# Patient Record
Sex: MALE | Race: OTHER | Employment: FULL TIME | URBAN - METROPOLITAN AREA
[De-identification: names, ages, dates, MRNs, and addresses within clinical notes are randomized per-mention and may not be internally consistent; named-entity substitution may affect disease eponyms.]

---

## 2023-01-24 SDOH — HEALTH STABILITY: PHYSICAL HEALTH: ON AVERAGE, HOW MANY MINUTES DO YOU ENGAGE IN EXERCISE AT THIS LEVEL?: 40 MIN

## 2023-01-24 SDOH — HEALTH STABILITY: PHYSICAL HEALTH: ON AVERAGE, HOW MANY DAYS PER WEEK DO YOU ENGAGE IN MODERATE TO STRENUOUS EXERCISE (LIKE A BRISK WALK)?: 2 DAYS

## 2023-01-24 ASSESSMENT — SOCIAL DETERMINANTS OF HEALTH (SDOH)
WITHIN THE LAST YEAR, HAVE YOU BEEN HUMILIATED OR EMOTIONALLY ABUSED IN OTHER WAYS BY YOUR PARTNER OR EX-PARTNER?: NO
WITHIN THE LAST YEAR, HAVE YOU BEEN KICKED, HIT, SLAPPED, OR OTHERWISE PHYSICALLY HURT BY YOUR PARTNER OR EX-PARTNER?: NO
WITHIN THE LAST YEAR, HAVE YOU BEEN AFRAID OF YOUR PARTNER OR EX-PARTNER?: NO
WITHIN THE LAST YEAR, HAVE TO BEEN RAPED OR FORCED TO HAVE ANY KIND OF SEXUAL ACTIVITY BY YOUR PARTNER OR EX-PARTNER?: NO

## 2023-01-27 ENCOUNTER — OFFICE VISIT (OUTPATIENT)
Dept: FAMILY MEDICINE CLINIC | Age: 38
End: 2023-01-27
Payer: COMMERCIAL

## 2023-01-27 ENCOUNTER — HOSPITAL ENCOUNTER (OUTPATIENT)
Dept: GENERAL RADIOLOGY | Age: 38
Discharge: HOME | End: 2023-01-27
Payer: COMMERCIAL

## 2023-01-27 ENCOUNTER — HOSPITAL ENCOUNTER (OUTPATIENT)
Age: 38
Discharge: HOME | End: 2023-01-27
Payer: COMMERCIAL

## 2023-01-27 VITALS
TEMPERATURE: 97.5 F | BODY MASS INDEX: 25.97 KG/M2 | OXYGEN SATURATION: 99 % | SYSTOLIC BLOOD PRESSURE: 90 MMHG | DIASTOLIC BLOOD PRESSURE: 78 MMHG | HEART RATE: 83 BPM | HEIGHT: 70 IN | WEIGHT: 181.4 LBS

## 2023-01-27 DIAGNOSIS — G89.29 CHRONIC MIDLINE LOW BACK PAIN WITHOUT SCIATICA: ICD-10-CM

## 2023-01-27 DIAGNOSIS — M54.50 CHRONIC MIDLINE LOW BACK PAIN WITHOUT SCIATICA: ICD-10-CM

## 2023-01-27 DIAGNOSIS — E78.2 MIXED HYPERLIPIDEMIA: ICD-10-CM

## 2023-01-27 DIAGNOSIS — Z00.00 ANNUAL PHYSICAL EXAM: Primary | ICD-10-CM

## 2023-01-27 PROCEDURE — 72100 X-RAY EXAM L-S SPINE 2/3 VWS: CPT

## 2023-01-27 PROCEDURE — 99385 PREV VISIT NEW AGE 18-39: CPT | Performed by: FAMILY MEDICINE

## 2023-01-27 SDOH — ECONOMIC STABILITY: FOOD INSECURITY: WITHIN THE PAST 12 MONTHS, YOU WORRIED THAT YOUR FOOD WOULD RUN OUT BEFORE YOU GOT MONEY TO BUY MORE.: NEVER TRUE

## 2023-01-27 SDOH — ECONOMIC STABILITY: FOOD INSECURITY: WITHIN THE PAST 12 MONTHS, THE FOOD YOU BOUGHT JUST DIDN'T LAST AND YOU DIDN'T HAVE MONEY TO GET MORE.: NEVER TRUE

## 2023-01-27 ASSESSMENT — LIFESTYLE VARIABLES
HOW OFTEN DO YOU HAVE A DRINK CONTAINING ALCOHOL: NEVER
HOW MANY STANDARD DRINKS CONTAINING ALCOHOL DO YOU HAVE ON A TYPICAL DAY: PATIENT DOES NOT DRINK

## 2023-01-27 ASSESSMENT — PATIENT HEALTH QUESTIONNAIRE - PHQ9
SUM OF ALL RESPONSES TO PHQ QUESTIONS 1-9: 0
SUM OF ALL RESPONSES TO PHQ9 QUESTIONS 1 & 2: 0
2. FEELING DOWN, DEPRESSED OR HOPELESS: 0
SUM OF ALL RESPONSES TO PHQ QUESTIONS 1-9: 0
1. LITTLE INTEREST OR PLEASURE IN DOING THINGS: 0
SUM OF ALL RESPONSES TO PHQ QUESTIONS 1-9: 0
SUM OF ALL RESPONSES TO PHQ QUESTIONS 1-9: 0

## 2023-01-27 ASSESSMENT — SOCIAL DETERMINANTS OF HEALTH (SDOH): HOW HARD IS IT FOR YOU TO PAY FOR THE VERY BASICS LIKE FOOD, HOUSING, MEDICAL CARE, AND HEATING?: NOT HARD AT ALL

## 2023-01-27 NOTE — PATIENT INSTRUCTIONS
Patient Education        Low Back Pain: Exercises  Introduction  Here are some examples of exercises for you to try. The exercises may be suggested for a condition or for rehabilitation. Start each exercise slowly. Ease off the exercises if you start to have pain. You will be told when to start these exercises and which ones will work best for you. How to do the exercises  Press-up  Lie on your stomach, supporting your body with your forearms. Press your elbows down into the floor to raise your upper back. As you do this, relax your stomach muscles and allow your back to arch without using your back muscles. As your press up, do not let your hips or pelvis come off the floor. Hold for 15 to 30 seconds, then relax. Repeat 2 to 4 times. Alternate arm and leg (bird dog) exercise  Do this exercise slowly. Try to keep your body straight at all times, and do not let one hip drop lower than the other. Start on the floor, on your hands and knees. Tighten your belly muscles. Raise one leg off the floor, and hold it straight out behind you. Be careful not to let your hip drop down, because that will twist your trunk. Hold for about 6 seconds, then lower your leg and switch to the other leg. Repeat 8 to 12 times on each leg. Over time, work up to holding for 10 to 30 seconds each time. If you feel stable and secure with your leg raised, try raising the opposite arm straight out in front of you at the same time. Knee-to-chest exercise  Lie on your back with your knees bent and your feet flat on the floor. Bring one knee to your chest, keeping the other foot flat on the floor (or keeping the other leg straight, whichever feels better on your lower back). Keep your lower back pressed to the floor. Hold for at least 15 to 30 seconds. Relax, and lower the knee to the starting position. Repeat with the other leg. Repeat 2 to 4 times with each leg.   To get more stretch, put your other leg flat on the floor while pulling your knee to your chest.  Curl-ups  Lie on the floor on your back with your knees bent at a 90-degree angle. Your feet should be flat on the floor, about 12 inches from your buttocks. Cross your arms over your chest. If this bothers your neck, try putting your hands behind your neck (not your head), with your elbows spread apart. Slowly tighten your belly muscles and raise your shoulder blades off the floor. Keep your head in line with your body, and do not press your chin to your chest.  Hold this position for 1 or 2 seconds, then slowly lower yourself back down to the floor. Repeat 8 to 12 times. Pelvic tilt exercise  Lie on your back with your knees bent. \"Brace\" your stomach. This means to tighten your muscles by pulling in and imagining your belly button moving toward your spine. You should feel like your back is pressing to the floor and your hips and pelvis are rocking back. Hold for about 6 seconds while you breathe smoothly. Repeat 8 to 12 times. Heel dig bridging  Lie on your back with both knees bent and your ankles bent so that only your heels are digging into the floor. Your knees should be bent about 90 degrees. Then push your heels into the floor, squeeze your buttocks, and lift your hips off the floor until your shoulders, hips, and knees are all in a straight line. Hold for about 6 seconds as you continue to breathe normally, and then slowly lower your hips back down to the floor and rest for up to 10 seconds. Do 8 to 12 repetitions. Hamstring stretch in doorway  Lie on your back in a doorway, with one leg through the open door. Slide your leg up the wall to straighten your knee. You should feel a gentle stretch down the back of your leg. Hold the stretch for at least 15 to 30 seconds. Do not arch your back, point your toes, or bend either knee. Keep one heel touching the floor and the other heel touching the wall. Repeat with your other leg.   Do 2 to 4 times for each leg.  Hip flexor stretch  Kneel on the floor with one knee bent and one leg behind you. Place your forward knee over your foot. Keep your other knee touching the floor. Slowly push your hips forward until you feel a stretch in the upper thigh of your rear leg. Hold the stretch for at least 15 to 30 seconds. Repeat with your other leg. Do 2 to 4 times on each side. Wall sit  Stand with your back 10 to 12 inches away from a wall. Lean into the wall until your back is flat against it. Slowly slide down until your knees are slightly bent, pressing your lower back into the wall. Hold for about 6 seconds, then slide back up the wall. Repeat 8 to 12 times. Follow-up care is a key part of your treatment and safety. Be sure to make and go to all appointments, and call your doctor if you are having problems. It's also a good idea to know your test results and keep a list of the medicines you take. Current as of: March 9, 2022               Content Version: 13.5  © 2006-2022 [employer]. Care instructions adapted under license by [facility] ([facility]). If you have questions about a medical condition or this instruction, always ask your healthcare professional. [place] [address] any warranty or liability for your use of this information.

## 2023-01-27 NOTE — PROGRESS NOTES
Zara Ernst is a 42 y.o. Male who came into the clinic today to establish care with me and for   appropriate management. Since I am seeing the patient for the first time today I did review in detail his medical   history. The patient informs me that he is not on any medication for any chronic medical problems. The patient   had some blood work done in January 2022 when his lipids were found to be elevated. The patient is trying to   manage it with diet and exercise. The patient would like to have his blood work rechecked at this time since it   has been a while it has been checked. The patient informs me that he has been noticing on and off lower back pain. As per the patient usually it happens   after he does some physical activity and he takes over-the-counter NSAIDs for the same with relief. The patient   denies any radiation of the pain. The patient denies any history of any trauma. The patient at this time would like   it to be further investigated. The patient also complains of noticing occasional headaches and cough cramps. I   recommended the patient to keep himself well-hydrated and to take over-the-counter NSAIDs for the same. The   patient would also like to have his vitamin D level checked at this time as well. Otherwise today he did not have   any other questions or concerns and all the question and concerns were appropriately answered. History reviewed. No pertinent past medical history. Patient Active Problem List   Diagnosis    Chronic midline low back pain without sciatica    Mixed hyperlipidemia       History reviewed. No pertinent surgical history.     Family History   Problem Relation Age of Onset    High Blood Pressure Mother     High Blood Pressure Father        Social History     Tobacco Use    Smoking status: Never    Smokeless tobacco: Never   Substance Use Topics    Alcohol use: Yes     Comment: rarely       No current outpatient medications on file prior to visit. No current facility-administered medications on file prior to visit. No Known Allergies    REVIEW OF SYSTEMS:   CONSTITUTIONAL: No weight loss, fever, chills, weakness or fatigue. HEENT: Eyes: No visual loss, blurred vision, double vision or yellow sclerae. Ears, Nose, Throat: No hearing loss, sneezing, congestion, runny nose or sore throat. SKIN: No rash or itching. CARDIOVASCULAR: No chest pain, chest pressure or chest discomfort. No palpitations or edema. RESPIRATORY: No shortness of breath, cough or sputum. GASTROINTESTINAL: No anorexia, nausea, vomiting, diarrhea or constipation. No abdominal pain, hematochezia or melena. GENITOURINARY:No dysuria, urgency, frequency, hematuria. NEUROLOGICAL: No headache, dizziness, syncope, paralysis, ataxia,   numbness or tingling in the extremities. No change in bowel or bladder control. MUSCULOSKELETAL: Complains of muscle pain, back pain, joint pain or stiffness. PSYCHIATRIC: No history of depression or anxiety. ENDOCRINOLOGIC: No reports of sweating, cold or heat intolerance. No polyuria or polydipsia. Objective     . BP 90/78   Pulse 83   Temp 97.5 °F (36.4 °C)   Ht 5' 10\" (1.778 m)   Wt 181 lb 6.4 oz (82.3 kg)   SpO2 99%   BMI 26.03 kg/m²     GENERAL:  Sydnie Hernandez is a 42 y.o.  male who is not in any acute distress. He was well attired and well groomed and was speaking in full sentences. HEENT:  Head:  Atraumatic, normocephalic. Eyes: Both the pupils are round,   equal and reactive to light. No squint noted. Normal red reflex is seen. Ears: Both external auditory canals are clear. No discharge noted. Tympanic membranes healthy. Normal light reflex is seen. Nose: Both the   nares are clear. No discharge noted. No epistaxis. Throat:  No posterior   pharyngeal wall erythema. Oral mucosa moist. No enlarged tonsils. NECK:  Supple. No cervical lymphadenopathy. No thyromegaly.     LUNGS: Normal ventilatory breath sounds are heard bilaterally. No crackles   or wheezes heard. CARDIOVASCULAR:  Normal S1, S2 heard. No murmurs heard. No JVD. GASTROINTESTINAL:  Abdomen soft, nontender. No guarding or rigidity noted. No organomegaly noted. Normal bowel sounds were heard. EXTREMITIES:  All 4 extremities were moving fine. Full range of motion is   present. No deformity noted. Peripheral pulses were felt. No lower   extremity edema. Neurovascular integrity maintained. NEUROLOGICAL:  The patient was alert, conscious, and cooperative. Oriented   to time, place, and person. Muscle power in all 4 limbs is 5/5. Cranial   nerves II thru XII are grossly intact. No sensory or motor loss. PSYCHIATRIC:  Appropriate mood and affect. No flights of ideas or thoughts. Intact recent and past memory. No confusion or delirium. No suicidal or homicidal ideations. SKIN:  Normal healthy. No rashes, marks, moles, or tattoos visible anywhere   on the body. BACK:  Normal alignment of the spine. Full range of motion is present. No   stepoff. No deformity. No spinal or paraspinal tenderness noted. Assessment/Plan     Diagnoses and all orders for this visit:    Annual physical exam  -     CBC with Auto Differential; Future  -     Comprehensive Metabolic Panel; Future  -     LIPID PANEL; Future  -     Vitamin D 25 Hydroxy; Future    Mixed hyperlipidemia  -     LIPID PANEL; Future    Chronic midline low back pain without sciatica  -     Vitamin D 25 Hydroxy; Future  -     XR LUMBAR SPINE (2-3 VIEWS); Future      Advise given:  - Get appropriate investigations done. Will call back with the results and will manage accordingly. - Take OTC NSAIDs as needed for pain. - Drink plenty of fluids.   - Eat five servings of fruits and vegetables everyday. - Discussed importance of regular exercise and recommended starting or continuing a regular exercise program for good health.    - The importance of annual eye exams was reviewed. - The importance of proper foot care and regularly checking feet to prevent sores and possibly loss of limbs was reviewed. next 6 months. - Appropriate handout were given to the patient. -  All the questions and concerns were appropriately answered. - Patient / family member / caregiver verbalized understanding of patient instructions from today's visit. - The patient was advised to follow up with me in 6 months for recheck or can call me before if has any other questions or concerns.

## 2023-01-28 DIAGNOSIS — G89.29 CHRONIC MIDLINE LOW BACK PAIN WITHOUT SCIATICA: ICD-10-CM

## 2023-01-28 DIAGNOSIS — Z00.00 ANNUAL PHYSICAL EXAM: ICD-10-CM

## 2023-01-28 DIAGNOSIS — E78.2 MIXED HYPERLIPIDEMIA: ICD-10-CM

## 2023-01-28 DIAGNOSIS — M54.50 CHRONIC MIDLINE LOW BACK PAIN WITHOUT SCIATICA: ICD-10-CM

## 2023-01-28 LAB
A/G RATIO: 2 (ref 1.1–2.2)
ALBUMIN SERPL-MCNC: 4.5 G/DL (ref 3.4–5)
ALP BLD-CCNC: 67 U/L (ref 40–129)
ALT SERPL-CCNC: 33 U/L (ref 10–40)
ANION GAP SERPL CALCULATED.3IONS-SCNC: 10 MMOL/L (ref 3–16)
AST SERPL-CCNC: 26 U/L (ref 15–37)
BASOPHILS ABSOLUTE: 0 K/UL (ref 0–0.2)
BASOPHILS RELATIVE PERCENT: 0.4 %
BILIRUB SERPL-MCNC: 0.7 MG/DL (ref 0–1)
BUN BLDV-MCNC: 10 MG/DL (ref 7–20)
CALCIUM SERPL-MCNC: 9.4 MG/DL (ref 8.3–10.6)
CHLORIDE BLD-SCNC: 104 MMOL/L (ref 99–110)
CHOLESTEROL, TOTAL: 206 MG/DL (ref 0–199)
CO2: 28 MMOL/L (ref 21–32)
CREAT SERPL-MCNC: 0.9 MG/DL (ref 0.9–1.3)
EOSINOPHILS ABSOLUTE: 0 K/UL (ref 0–0.6)
EOSINOPHILS RELATIVE PERCENT: 1.1 %
GFR SERPL CREATININE-BSD FRML MDRD: >60 ML/MIN/{1.73_M2}
GLUCOSE BLD-MCNC: 99 MG/DL (ref 70–99)
HCT VFR BLD CALC: 45.7 % (ref 40.5–52.5)
HDLC SERPL-MCNC: 39 MG/DL (ref 40–60)
HEMOGLOBIN: 15.3 G/DL (ref 13.5–17.5)
LDL CHOLESTEROL CALCULATED: 138 MG/DL
LYMPHOCYTES ABSOLUTE: 1.4 K/UL (ref 1–5.1)
LYMPHOCYTES RELATIVE PERCENT: 42.8 %
MCH RBC QN AUTO: 29.8 PG (ref 26–34)
MCHC RBC AUTO-ENTMCNC: 33.4 G/DL (ref 31–36)
MCV RBC AUTO: 89.3 FL (ref 80–100)
MONOCYTES ABSOLUTE: 0.2 K/UL (ref 0–1.3)
MONOCYTES RELATIVE PERCENT: 5.2 %
NEUTROPHILS ABSOLUTE: 1.7 K/UL (ref 1.7–7.7)
NEUTROPHILS RELATIVE PERCENT: 50.5 %
PDW BLD-RTO: 12.9 % (ref 12.4–15.4)
PLATELET # BLD: 208 K/UL (ref 135–450)
PMV BLD AUTO: 7.9 FL (ref 5–10.5)
POTASSIUM SERPL-SCNC: 4.2 MMOL/L (ref 3.5–5.1)
RBC # BLD: 5.12 M/UL (ref 4.2–5.9)
SODIUM BLD-SCNC: 142 MMOL/L (ref 136–145)
TOTAL PROTEIN: 6.8 G/DL (ref 6.4–8.2)
TRIGL SERPL-MCNC: 145 MG/DL (ref 0–150)
VITAMIN D 25-HYDROXY: 17.8 NG/ML
VLDLC SERPL CALC-MCNC: 29 MG/DL
WBC # BLD: 3.3 K/UL (ref 4–11)

## 2023-02-14 ENCOUNTER — TELEPHONE (OUTPATIENT)
Dept: FAMILY MEDICINE CLINIC | Age: 38
End: 2023-02-14

## 2023-02-14 SDOH — ECONOMIC STABILITY: INCOME INSECURITY: HOW HARD IS IT FOR YOU TO PAY FOR THE VERY BASICS LIKE FOOD, HOUSING, MEDICAL CARE, AND HEATING?: NOT VERY HARD

## 2023-02-14 SDOH — ECONOMIC STABILITY: FOOD INSECURITY: WITHIN THE PAST 12 MONTHS, YOU WORRIED THAT YOUR FOOD WOULD RUN OUT BEFORE YOU GOT MONEY TO BUY MORE.: NEVER TRUE

## 2023-02-14 SDOH — ECONOMIC STABILITY: FOOD INSECURITY: WITHIN THE PAST 12 MONTHS, THE FOOD YOU BOUGHT JUST DIDN'T LAST AND YOU DIDN'T HAVE MONEY TO GET MORE.: NEVER TRUE

## 2023-02-14 SDOH — ECONOMIC STABILITY: HOUSING INSECURITY
IN THE LAST 12 MONTHS, WAS THERE A TIME WHEN YOU DID NOT HAVE A STEADY PLACE TO SLEEP OR SLEPT IN A SHELTER (INCLUDING NOW)?: NO

## 2023-02-14 SDOH — ECONOMIC STABILITY: TRANSPORTATION INSECURITY
IN THE PAST 12 MONTHS, HAS LACK OF TRANSPORTATION KEPT YOU FROM MEETINGS, WORK, OR FROM GETTING THINGS NEEDED FOR DAILY LIVING?: NO

## 2023-02-15 ENCOUNTER — OFFICE VISIT (OUTPATIENT)
Dept: FAMILY MEDICINE CLINIC | Age: 38
End: 2023-02-15
Payer: COMMERCIAL

## 2023-02-15 VITALS
DIASTOLIC BLOOD PRESSURE: 80 MMHG | WEIGHT: 170.4 LBS | TEMPERATURE: 97.1 F | BODY MASS INDEX: 25.82 KG/M2 | HEIGHT: 68 IN | HEART RATE: 79 BPM | OXYGEN SATURATION: 95 % | SYSTOLIC BLOOD PRESSURE: 108 MMHG

## 2023-02-15 DIAGNOSIS — K64.5 EXTERNAL HEMORRHOID, THROMBOSED: Primary | ICD-10-CM

## 2023-02-15 PROCEDURE — 99213 OFFICE O/P EST LOW 20 MIN: CPT | Performed by: FAMILY MEDICINE

## 2023-02-15 RX ORDER — HYDROCORTISONE ACETATE 25 MG/1
25 SUPPOSITORY RECTAL EVERY 12 HOURS
Qty: 12 SUPPOSITORY | Refills: 0 | Status: SHIPPED | OUTPATIENT
Start: 2023-02-15

## 2023-02-15 RX ORDER — LIDOCAINE HCL AND HYDROCORTISONE ACETATE 28; 5.5 MG/G; MG/G
1 GEL RECTAL 2 TIMES DAILY
Qty: 1 EACH | Refills: 0 | Status: SHIPPED | OUTPATIENT
Start: 2023-02-15

## 2023-02-15 NOTE — PROGRESS NOTES
Zara Lomeli is a 42 y.o. Male who came into the clinic today with concerns of tender mass felt at   the anal opening since past 2 days. The patient informs me that since past 2 days he has been noticing a very   tender mass at his anal opening. The patient denies any bleeding or discharge in the area. The patient denies   any history of hemorrhoids in the past.  The patient denies any personal or family history of any colon cancer or   anal cancer. I advised the patient that at seems consistent with thrombosed hemorrhoids and he is agreeable to   visit specialist office for further management. No other questions or concerns today and all the question concerns   were appropriately answered. History reviewed. No pertinent past medical history. Patient Active Problem List   Diagnosis    Chronic midline low back pain without sciatica    Mixed hyperlipidemia       History reviewed. No pertinent surgical history. Family History   Problem Relation Age of Onset    High Blood Pressure Mother     High Blood Pressure Father        Social History     Tobacco Use    Smoking status: Never    Smokeless tobacco: Never   Substance Use Topics    Alcohol use: Yes     Comment: rarely       No current outpatient medications on file prior to visit. No current facility-administered medications on file prior to visit. No Known Allergies    REVIEW OF SYSTEMS:   CONSTITUTIONAL: No weight loss, fever, chills, weakness or fatigue. HEENT: Eyes: No visual loss, blurred vision, double vision or yellow sclerae. Ears, Nose, Throat: No hearing loss, sneezing, congestion, runny nose or sore throat. SKIN: No rash or itching. CARDIOVASCULAR: No chest pain, chest pressure or chest discomfort. No palpitations or edema. RESPIRATORY: No shortness of breath, cough or sputum. GASTROINTESTINAL: No anorexia, nausea, vomiting, diarrhea or constipation. No abdominal pain, hematochezia or melena. GENITOURINARY:No dysuria, urgency, frequency, hematuria. External hemorrhoids  NEUROLOGICAL: No headache, dizziness, syncope, paralysis, ataxia,   numbness or tingling in the extremities. No change in bowel or bladder control. MUSCULOSKELETAL: No muscle pain, back pain, joint pain or stiffness. PSYCHIATRIC: No history of depression or anxiety. ENDOCRINOLOGIC: No reports of sweating, cold or heat intolerance. No polyuria or polydipsia. Objective     . /80   Pulse 79   Temp 97.1 °F (36.2 °C)   Ht 5' 8\" (1.727 m)   Wt 170 lb 6.4 oz (77.3 kg)   SpO2 95%   BMI 25.91 kg/m²     GENERAL:  Aicha Rao is a 42 y.o.  male who is not in any acute distress. He was well attired and well groomed and was speaking in full sentences. LUNGS:  Normal ventilatory breath sounds are heard bilaterally. No crackles   or wheezes heard. CARDIOVASCULAR:  Normal S1, S2 heard. No murmurs heard. No JVD. RECTAL: Round, very tender, bluish mass seen and felt at the anal opening consistent   with external hemorrhoids. Further exam could not be done due to pain. Assessment/Plan     Diagnoses and all orders for this visit:    External hemorrhoid, thrombosed  -     hydrocortisone (ANUSOL-HC) 25 MG suppository; Place 1 suppository rectally in the morning and 1 suppository in the evening.  -     Lidocaine-Hydrocortisone Ace 2.8-0.55 % GEL; Place 1 Applicatorful rectally in the morning and at bedtime  -     Jaison Mosley MD, Colorectal Surgery, [facility]      Advise given:  - Take all prescription medication as prescribed. - Drink plenty of fluids.   - Eat five servings of fruits and vegetables everyday. - Appropriate handout were given to the patient. -  All the questions and concerns were appropriately answered. - Patient / family member / caregiver verbalized understanding of patient instructions from today's visit.    - The patient was advised to follow up with me in 3 months for recheck or can call me before if has any other questions or concerns.

## 2023-02-16 ENCOUNTER — OFFICE VISIT (OUTPATIENT)
Dept: SURGERY | Age: 38
End: 2023-02-16

## 2023-02-16 ENCOUNTER — TELEPHONE (OUTPATIENT)
Dept: SURGERY | Age: 38
End: 2023-02-16

## 2023-02-16 VITALS
BODY MASS INDEX: 26.22 KG/M2 | TEMPERATURE: 98.3 F | HEIGHT: 68 IN | HEART RATE: 79 BPM | OXYGEN SATURATION: 100 % | DIASTOLIC BLOOD PRESSURE: 76 MMHG | SYSTOLIC BLOOD PRESSURE: 130 MMHG | WEIGHT: 173 LBS

## 2023-02-16 DIAGNOSIS — K64.5 THROMBOSED HEMORRHOIDS: Primary | ICD-10-CM

## 2023-02-16 RX ORDER — AMOXICILLIN AND CLAVULANATE POTASSIUM 875; 125 MG/1; MG/1
1 TABLET, FILM COATED ORAL 2 TIMES DAILY
Qty: 14 TABLET | Refills: 0 | Status: SHIPPED | OUTPATIENT
Start: 2023-02-16 | End: 2023-02-23

## 2023-02-16 NOTE — Clinical Note
Isaac Slaughter,  Thank you for referring Mr. Riggs. Will continue conservative measures for hemorrhoid.    Andrea Beasley M.D. 2/16/23  11:17 AM

## 2023-02-16 NOTE — PROGRESS NOTES
Subjective:     Patient is a 42 y.o. man with thrombosed hemorrhoid    The patient is referred by Dr. Deuce Su MD. Results of consultation will be shared via electronic medical record    HPI: Mr. Kelly Regalado reports some rectal pain and swelling that started Monday after long car ride. He thought it started like a pimple. It got better yesterday than worse this morning. He never had this before. Patient Active Problem List    Diagnosis Date Noted    Chronic midline low back pain without sciatica 01/27/2023    Mixed hyperlipidemia 01/27/2023     Not in a hospital admission. No Known Allergies   Social History     Tobacco Use    Smoking status: Never    Smokeless tobacco: Never   Substance Use Topics    Alcohol use: Yes     Comment: rarely      Family History   Problem Relation Age of Onset    High Blood Pressure Mother     High Blood Pressure Father       Review of Systems    GEN: reviewed and negative except as noted in HPI. GI: reviewed and negative except as noted in HPI. Objective:     GEN: appears well, no distress, appears stated age  PSYCH: normal mood, normal affect  NECK: no neck masses, trachea midline  ENT: moist oral mucosa; anicteric  SKIN: no rash or jaundice  CV: regular heart rate and rhythm  PULM: normal respiratory effort, no wheezing  GI: soft non tender abdomen. Normal bowel sounds  RECTAL: examined with chaperone Delma Nelson MA present. 1 cm nodule left lateral anus right at verge externally and this looks like a small thrombosed hemorrhoid. No bleeding or discharge. No surrounding erythema or swelling   EXT/NEURO: normal gait, strength/sensation grossly intact in all extremities    CBC 1-28-23: WBC 3.3  HCT 45.7   Assessment: Thrombosed hemorrhoid    Plan:     Discussed RBA of various treatment options. Discussed the indications where hemorrhoidectomy may be considered and the considerable pain expectations with this operation. Discussed topical creams and warm bath may help. This looks like a thrombosed hemorrhoid but his suggestion that it started like a pimple raises my concern that it may be a small superficial infection. Will add antibiotic prescription.  Return precautions discussed    Junior Espinal M.D.  2/16/23   11:16 AM
